# Patient Record
(demographics unavailable — no encounter records)

---

## 2025-07-26 NOTE — REASON FOR VISIT
[Annual] : an annual visit. [FreeTextEntry2] : pt states having frequent urination for the past week

## 2025-07-26 NOTE — HISTORY OF PRESENT ILLNESS
[N] : Patient reports normal menses [Condoms] : uses condoms [Y] : Positive pregnancy history [Menarche Age: ____] : age at menarche was [unfilled] [Currently Active] : currently active [Men] : men [No] : No [Mammogramdate] : 03/28/2025 [TextBox_19] : bilateral br2 [PapSmeardate] : 04/01/2024 [TextBox_31] : neg  [BoneDensityDate] : n/a  [TextBox_43] : 2021- wnl per pt, repeat @ 45yrs  EGD: 2024-wnl per pt  [HPVDate] : 04/01/2024 [TextBox_78] : neg  [LMPDate] : 07/14/2025 [PGHxTotal] : 2 [[facility] : 2 [[facility] : 2 [FreeTextEntry1] : 07/14/2025 [FreeTextEntry2] :

## 2025-07-26 NOTE — COUNSELING
[Nutrition/ Exercise/ Weight Management] : nutrition, exercise, weight management [Vitamins/Supplements] : vitamins/supplements [Sunscreen] : sunscreen [Breast Self Exam] : breast self exam [Bladder Hygiene] : bladder hygiene [Contraception/ Emergency Contraception/ Safe Sexual Practices] : contraception, emergency contraception, safe sexual practices [Confidentiality] : confidentiality [STD (testing, results, tx)] : STD (testing, results, tx) [HPV Vaccine] : HPV Vaccine

## 2025-07-26 NOTE — PHYSICAL EXAM
[MA] : MA [Appropriately responsive] : appropriately responsive [Alert] : alert [No Acute Distress] : no acute distress [No Lymphadenopathy] : no lymphadenopathy [Soft] : soft [Non-tender] : non-tender [Non-distended] : non-distended [No HSM] : No HSM [No Lesions] : no lesions [No Mass] : no mass [Oriented x3] : oriented x3 [Examination Of The Breasts] : a normal appearance [Breast Palpation Diffuse Fibrous Tissue Bilateral] : fibrocystic changes [No Masses] : no breast masses were palpable [Labia Majora] : normal [Labia Minora] : normal [Normal] : normal [Uterine Adnexae] : normal

## 2025-07-26 NOTE — PLAN
[FreeTextEntry1] : Screening breast sono for extremely dense breasts fibrocystic tissue through bilateral breasts on clinical exam today- no dominant masses or adenopathy found or reported lumps by pt-just extra screening FU UC- fu uro gyn if urinary freq persists fu annually pa pgyn fu lab work for weight loss- call for any abnormal bleeding reviewed Increase weight training continue with psychiatry- normal PHQ-2